# Patient Record
Sex: MALE | Race: WHITE | URBAN - METROPOLITAN AREA
[De-identification: names, ages, dates, MRNs, and addresses within clinical notes are randomized per-mention and may not be internally consistent; named-entity substitution may affect disease eponyms.]

---

## 2021-01-27 ENCOUNTER — OFFICE VISIT (OUTPATIENT)
Dept: HEMATOLOGY | Age: 61
End: 2021-01-27
Payer: MEDICAID

## 2021-01-27 ENCOUNTER — HOSPITAL ENCOUNTER (OUTPATIENT)
Dept: LAB | Age: 61
Discharge: HOME | End: 2021-01-27

## 2021-01-27 VITALS
HEART RATE: 149 BPM | WEIGHT: 183.31 LBS | HEIGHT: 66 IN | TEMPERATURE: 99.9 F | SYSTOLIC BLOOD PRESSURE: 146 MMHG | BODY MASS INDEX: 29.46 KG/M2 | DIASTOLIC BLOOD PRESSURE: 99 MMHG | OXYGEN SATURATION: 98 %

## 2021-01-27 DIAGNOSIS — K76.0 FATTY LIVER: Primary | ICD-10-CM

## 2021-01-27 LAB — XX-LABCORP SPECIMEN COL,LCBCF: NORMAL

## 2021-01-27 PROCEDURE — 99001 SPECIMEN HANDLING PT-LAB: CPT

## 2021-01-27 PROCEDURE — 99204 OFFICE O/P NEW MOD 45 MIN: CPT | Performed by: NURSE PRACTITIONER

## 2021-01-27 RX ORDER — OMEPRAZOLE 20 MG/1
TABLET, DELAYED RELEASE ORAL
COMMUNITY
Start: 2020-12-04

## 2021-01-27 RX ORDER — ROSUVASTATIN CALCIUM 10 MG/1
20 TABLET, COATED ORAL
COMMUNITY
Start: 2020-11-11 | End: 2021-08-26

## 2021-01-27 RX ORDER — ENALAPRIL MALEATE 20 MG/1
20 TABLET ORAL EVERY MORNING
COMMUNITY
Start: 2020-10-12

## 2021-01-27 NOTE — PROGRESS NOTES
[place] [address]      Nolan Cushing, MD, Jackie Gomez, Morrison Fabry, MD, MPH      Leonor Gagnon, PA-ALICIA Jay, [facility]-BC     Evelin Murdock, [facility]   Marly Musa, FNP-C    Isabel Llamaspaul, [facility]       Bensonsubha Patel Rudy De Trejo 136    at [address], [address] Edi Waddell  22.    [phone]    FAX: [address] [place] [address], [address]    [phone]    FAX: [phone]       Patient Care Team:  Jaja Mcleod MD as PCP - General (Family Medicine)  Emily Woods MD (Oncology)    Problem List  Date Reviewed: 2/1/2021          Codes Class Noted    Malignant melanoma [facility]) ICD-10-CM: C43.9  ICD-9-CM: 172.9  2/1/2021        Fatty liver ICD-10-CM: K76.0  ICD-9-CM: 571.8  2/1/2021        Hypertension ICD-10-CM: I10  ICD-9-CM: 401.9  2/1/2021        Hypercholesteremia ICD-10-CM: E78.00  ICD-9-CM: 272.0  2/1/2021            The clinicians listed above have asked me to see Valencia Ayers in consultation regarding suspected fatty liver disease and its management. All medical records sent by the referring physicians were reviewed including imaging studies     The patient is a 63 y.o.  male who is suspected to have fatty liver disease based upon PET/CT scan. The most recent laboratory studies indicate that the liver transaminases are elevated,  alkaline phosphatase is normal, tests of hepatic synthetic and metabolic function are normal, and the platelet count is normal.      Serologic evaluation for markers of chronic liver disease has either not been performed or the results are not     The most recent imaging of the liver was CT performed in 11/2020. Results suggest fatty liver disease.      An assessment of liver fibrosis with biopsy or elastography has not been performed. The patient has no symptoms which can be attributed to the liver disorder. The patient is not currently experiencing the following symptoms of liver disease: fatigue, pain in the right side over the liver. The patient completes all daily activities without any functional limitations. ASSESSMENT AND PLAN:  Fatty liver  Suspect the patient has fatty liver based upon imaging, features of metabolic syndrome. The histologic severity has not been defined. NAFL is a benign form of fatty liver disease and not thought to progress to fibrosis or cirrhosis. Liver transaminases are elevated. ALP is normal. Liver function is normal. The platelet count is normal.      Based upon laboratory studies and imaging  the patient does not appear to have significant liver injury. Have performed laboratory testing to monitor liver function and degree of liver injury. This included BMP,   hepatic panel, CBC with platelet count. Serologic testing for causes of chronic liver disease was ordered. Will perform additional serologic tests to screen for other causes of chronic liver disease. The need to perform an assessment of liver fibrosis was discussed with the patient. The Fibroscan can assess liver fibrosis and determine if a patient has advanced fibrosis or cirrhosis without the need for liver biopsy. This will be performed at the next office visit. If the Fibroscan suggests advanced fibrosis then a liver biopsy should be considered. The Fibroscan can be repeated annually or as often as clinically indicated to assess for fibrosis progression and/or regression. Since a liver biopsy was not performed it is possible the patient may not have fatty liver or this may not be contributing to the elevation in liver enzymes.       If liver enzymes do not return to normal with weight reduction then additional evaluation including liver biopsy may be necessary to determine if the elevated liver enzymes is due to another etiology. Counseling for diet and weight loss in patients with confirmed or suspected NAFLD  The patient was counseled regarding diet and exercise to achieve weight loss. The best diet for patients with fatty liver is one very low in carbohydrates and enriched with protein such as an Reese's program.      The patient was told not to consume any food products and drinks containing fructose as this enhances hepatic fat synthesis. There is no medication or vitamin supplements that we advocate for GAFFNEY. Using glitazones in patients without diabetes mellitus has been shown to reduce fat content in the liver but has no effect on fibrosis and is associated with weight gain. Vitamin E has also been used but the data is not very good and most experts no longer advocate this. Screening for Hepatocellular Carcinoma  HCC screening is not necessary if the patient has no evidence of cirrhosis. Treatment of other medical problems in patients with chronic liver disease  There are no contraindications for the patient to take most medications that are necessary for treatment of other medical issues. The patient can take any medications utilized for treatment of DM, statins to treat hypercholesterolemia    The patient does not consume alcohol on a daily basis. Normal doses of acetaminophen, as recommended on the label of the bottle, are not hepatotoxic except in the setting of daily alcohol use, even in patients with cirrhosis and can be utilized for pain. Counseling for alcohol in patients with chronic liver disease  The patient was counseled regarding alcohol consumption and the effect of alcohol on chronic liver disease. The patient does not consume any significant amount of alcohol. The patient was reminded that alcohol can cause fatty liver.     Vaccinations   Vaccination for viral hepatitis A and B is recommended since the patient has no serologic evidence of previous exposure or vaccination with immunity. Routine vaccinations against other bacterial and viral agents can be performed as indicated. Annual flu vaccination should be administered if indicated. ALLERGIES  Allergies   Allergen Reactions    Penicillins Hives       MEDICATIONS  Current Outpatient Medications   Medication Sig    rosuvastatin (CRESTOR) 10 mg tablet Take 10 mg by mouth.  enalapril (VASOTEC) 20 mg tablet Take 20 mg by mouth Every morning.  omeprazole (PRILOSEC OTC) 20 mg tablet      No current facility-administered medications for this visit. SYSTEM REVIEW NOT RELATED TO LIVER DISEASE OR REVIEWED ABOVE:  Constitution systems: Negative for fever, chills, weight gain, weight loss. Eyes: Negative for visual changes. ENT: Negative for sore throat, painful swallowing. Respiratory: Negative for cough, hemoptysis, SOB. Cardiology: Negative for chest pain, palpitations. GI:  Negative for constipation or diarrhea. : Negative for urinary frequency, dysuria, hematuria, nocturia. Skin: Negative for rash. Hematology: Negative for easy bruising, blood clots. Musculo-skelatal: Negative for back pain, muscle pain, weakness. Neurologic: Negative for headaches, dizziness, vertigo, memory problems not related to HE. Psychology: Negative for anxiety, depression. FAMILY HISTORY:  The father Has/had the following following chronic disease(s): cancer, HTN. The mother Has/had the following chronic disease(s): NONE. There is no family history of liver disease. SOCIAL HISTORY:  The patient is . The patient has 2 children. The patient has never used tobacco products. The patient has never consumed significant amounts of alcohol. The patient is currently receiving disability.         PHYSICAL EXAMINATION:  Visit Vitals  BP (!) 146/99   Pulse (!) 149   Temp 99.9 °F (37.7 °C) (Tympanic)   Ht 5' 6\" (1.676 m)   Wt 183 lb 5 oz (83.2 kg)   SpO2 98%   BMI 29.59 kg/m²       General: No acute distress. Eyes: Sclera anicteric. ENT: No oral lesions. Thyroid normal.  Nodes: No adenopathy. Skin: No spider angiomata. No jaundice. No palmar erythema. Respiratory: Lungs clear to auscultation. Cardiovascular: Regular heart rate. No murmurs. No JVD. Abdomen: Soft non-tender, liver size normal to percussion/palpation. Spleen not palpable. No obvious ascites. Extremities: No edema. No muscle wasting. No gross arthritic changes. Neurologic: Alert and oriented. Cranial nerves grossly intact. No asterixis. LABORATORY STUDIES:  Liver [place] of [address] & Units 1/27/2021   WBC 3.4 - 10.8 x10E3/uL 5.6   ANC 1.4 - 7.0 x10E3/uL 3.8   HGB 13.0 - 17.7 g/dL 16.3    - 450 x10E3/uL 246   AST 0 - 40 IU/L 64 (H)   ALT 0 - 44 IU/L 68 (H)   Alk Phos 39 - 117 IU/L 77   Bili, Total 0.0 - 1.2 mg/dL 0.3   Bili, Direct 0.00 - 0.40 mg/dL 0.15   Albumin 3.8 - 4.8 g/dL 4.9 (H)   BUN 8 - 27 mg/dL 8   Creat 0.76 - 1.27 mg/dL 0.83   Na 134 - 144 mmol/L 145 (H)   K 3.5 - 5.2 mmol/L 4.0   Cl 96 - 106 mmol/L 103   CO2 20 - 29 mmol/L 23   Glucose 65 - 99 mg/dL 94       SEROLOGIES:  Serologies Latest Ref Rng & Units 1/27/2021   Hep A Ab, Total Negative Negative   Hep B Surface Ag Negative Negative   Hep B Core Ab, Total Negative Negative   Hep B Surface AB QL  Non Reactive   Hep C Ab 0.0 - 0.9 s/co ratio 0.1   Ferritin 30 - 400 ng/mL 194   Iron % Saturation 15 - 55 % 37   GIOVANNI, IFA  Negative   ASMCA 0 - 19 Units 7       LIVER HISTOLOGY:  Not available or performed    ENDOSCOPIC PROCEDURES:  Not available or performed    RADIOLOGY:  Not available or performed    OTHER TESTING:  Not available or performed    FOLLOW-UP:  All of the issues listed above in the Assessment and Plan were discussed with the patient. All questions were answered. The patient expressed a clear understanding of the above.     Follow-up Isaiah Arriola 32 in 4 weeks for Fibroscan to review all data and determine the treatment plan.       INGE Allen-BC  [address] Liver [place] of [address], [facility] [address]  [facility], [address] [place] [address] Street - [address]  [phone]

## 2021-01-29 LAB
ACTIN IGG SERPL-ACNC: 7 UNITS (ref 0–19)
ALBUMIN SERPL-MCNC: 4.9 G/DL (ref 3.8–4.8)
ALP SERPL-CCNC: 77 IU/L (ref 39–117)
ALT SERPL-CCNC: 68 IU/L (ref 0–44)
ANA TITR SER IF: NEGATIVE {TITER}
AST SERPL-CCNC: 64 IU/L (ref 0–40)
BASOPHILS # BLD AUTO: 0.1 X10E3/UL (ref 0–0.2)
BASOPHILS NFR BLD AUTO: 1 %
BILIRUB DIRECT SERPL-MCNC: 0.15 MG/DL (ref 0–0.4)
BILIRUB SERPL-MCNC: 0.3 MG/DL (ref 0–1.2)
BUN SERPL-MCNC: 8 MG/DL (ref 8–27)
BUN/CREAT SERPL: 10 (ref 10–24)
CALCIUM SERPL-MCNC: 9.4 MG/DL (ref 8.6–10.2)
CHLORIDE SERPL-SCNC: 103 MMOL/L (ref 96–106)
CO2 SERPL-SCNC: 23 MMOL/L (ref 20–29)
CREAT SERPL-MCNC: 0.83 MG/DL (ref 0.76–1.27)
EOSINOPHIL # BLD AUTO: 0.1 X10E3/UL (ref 0–0.4)
EOSINOPHIL NFR BLD AUTO: 1 %
ERYTHROCYTE [DISTWIDTH] IN BLOOD BY AUTOMATED COUNT: 12.4 % (ref 11.6–15.4)
FERRITIN SERPL-MCNC: 194 NG/ML (ref 30–400)
GLUCOSE SERPL-MCNC: 94 MG/DL (ref 65–99)
HAV AB SER QL IA: NEGATIVE
HBV CORE AB SERPL QL IA: NEGATIVE
HBV SURFACE AB SER QL: NON REACTIVE
HBV SURFACE AG SERPL QL IA: NEGATIVE
HCT VFR BLD AUTO: 47.4 % (ref 37.5–51)
HCV AB S/CO SERPL IA: 0.1 S/CO RATIO (ref 0–0.9)
HCV AB SERPL QL IA: NORMAL
HGB BLD-MCNC: 16.3 G/DL (ref 13–17.7)
IMM GRANULOCYTES # BLD AUTO: 0.1 X10E3/UL (ref 0–0.1)
IMM GRANULOCYTES NFR BLD AUTO: 1 %
IRON SATN MFR SERPL: 37 % (ref 15–55)
IRON SERPL-MCNC: 114 UG/DL (ref 38–169)
LYMPHOCYTES # BLD AUTO: 1 X10E3/UL (ref 0.7–3.1)
LYMPHOCYTES NFR BLD AUTO: 18 %
MCH RBC QN AUTO: 33.1 PG (ref 26.6–33)
MCHC RBC AUTO-ENTMCNC: 34.4 G/DL (ref 31.5–35.7)
MCV RBC AUTO: 96 FL (ref 79–97)
MONOCYTES # BLD AUTO: 0.7 X10E3/UL (ref 0.1–0.9)
MONOCYTES NFR BLD AUTO: 12 %
NEUTROPHILS # BLD AUTO: 3.8 X10E3/UL (ref 1.4–7)
NEUTROPHILS NFR BLD AUTO: 67 %
PLATELET # BLD AUTO: 246 X10E3/UL (ref 150–450)
POTASSIUM SERPL-SCNC: 4 MMOL/L (ref 3.5–5.2)
PROT SERPL-MCNC: 7.4 G/DL (ref 6–8.5)
RBC # BLD AUTO: 4.93 X10E6/UL (ref 4.14–5.8)
SODIUM SERPL-SCNC: 145 MMOL/L (ref 134–144)
TIBC SERPL-MCNC: 309 UG/DL (ref 250–450)
UIBC SERPL-MCNC: 195 UG/DL (ref 111–343)
WBC # BLD AUTO: 5.6 X10E3/UL (ref 3.4–10.8)

## 2021-02-01 PROBLEM — E78.00 HYPERCHOLESTEREMIA: Status: ACTIVE | Noted: 2021-02-01

## 2021-02-01 PROBLEM — C43.9 MALIGNANT MELANOMA (HCC): Status: ACTIVE | Noted: 2021-02-01

## 2021-02-01 PROBLEM — K76.0 FATTY LIVER: Status: ACTIVE | Noted: 2021-02-01

## 2021-02-01 PROBLEM — I10 HYPERTENSION: Status: ACTIVE | Noted: 2021-02-01

## 2021-02-25 ENCOUNTER — OFFICE VISIT (OUTPATIENT)
Dept: HEMATOLOGY | Age: 61
End: 2021-02-25
Payer: MEDICAID

## 2021-02-25 VITALS
OXYGEN SATURATION: 99 % | RESPIRATION RATE: 17 BRPM | DIASTOLIC BLOOD PRESSURE: 90 MMHG | HEART RATE: 140 BPM | BODY MASS INDEX: 30.22 KG/M2 | HEIGHT: 66 IN | WEIGHT: 188 LBS | SYSTOLIC BLOOD PRESSURE: 145 MMHG

## 2021-02-25 DIAGNOSIS — K76.0 FATTY LIVER: Primary | ICD-10-CM

## 2021-02-25 PROCEDURE — 91200 LIVER ELASTOGRAPHY: CPT | Performed by: NURSE PRACTITIONER

## 2021-02-25 PROCEDURE — 99214 OFFICE O/P EST MOD 30 MIN: CPT | Performed by: NURSE PRACTITIONER

## 2021-02-25 NOTE — PROGRESS NOTES
[address] [facility]      Lj Gan MD, Gretchen Rabago, Katie Hunter MD, MPH      MATTHIAS Ta-ALICIA Alvarado, [facility]-BC     Evelin Murdock, [facility]   Jose Wells P-ALICIA Mullins, [facility]       Benson Patel Rudy De Trejo 136    at [address], [facility] Edi Waddell  22.    [phone]    FAX: [address] [place] [address] [address], [address]    [phone]    FAX: [phone]       Patient Care Team:  Hollis Torres MD as PCP - General (Family Medicine)  Alicia Alford MD (Oncology)    Problem List  Date Reviewed: 2/1/2021          Codes Class Noted    Malignant melanoma ([facility] [address] ICD-10-CM: C43.9  ICD-9-CM: 172.9  2/1/2021        Fatty liver ICD-10-CM: K76.0  ICD-9-CM: 571.8  2/1/2021        Hypertension ICD-10-CM: I10  ICD-9-CM: 401.9  2/1/2021        Hypercholesteremia ICD-10-CM: E78.00  ICD-9-CM: 272.0  2/1/2021              Tereso Buitrago is being seen at [facility] for management of suspected non-alcoholic fatty liver disease (NAFLD)  The active problem list, all pertinent past medical history, medications, liver histology, radiologic findings, and laboratory findings related to the liver disorder were reviewed with the patient. The patient is a 63 y.o.  male who is suspected to have fatty liver disease based upon PET/CT scan. Serologic evaluation for markers of chronic liver disease are negative. The most recent imaging of the liver was CT performed in 11/2020. Results suggest fatty liver disease. Assessment of liver fibrosis with Fibroscan was performed in the office today. The result was 7.4 kPa which correlates with stage 2 septal fibrosis.  The CAP score of 370 suggests hepatic steatosis. The patient has no symptoms which can be attributed to the liver disorder. The patient is not currently experiencing the following symptoms of liver disease: fatigue, pain in the right side over the liver. The patient completes all daily activities without any functional limitations. ASSESSMENT AND PLAN:  NAFLD  The diagnosis is based upon imaging, Fiboscan CAP score, features of metabolic syndrome, serologic studies that are negative for other causes of chronic liver disease. Elastography performed in 2/2021 suggests stage 2 septal fibrosis. Liver transaminases are elevated. ALP is normal. Liver function is normal. The platelet count is normal.      Based upon laboratory studies, Fibroscan, and imaging  the patient does not appear to have significant liver injury. Have performed laboratory testing to monitor liver function and degree of liver injury. This included BMP,   hepatic panel, CBC with platelet count. Serologic testing for causes of chronic liver disease were negative. The need to perform an assessment of liver fibrosis was discussed with the patient. The Fibroscan can assess liver fibrosis and determine if a patient has advanced fibrosis or cirrhosis without the need for liver biopsy. The Fibroscan can be repeated annually or as often as clinically indicated to assess for fibrosis progression and/or regression. Only a liver biopsy can confirm if the patient does have fatty liver and differentiate NAFL from GAFFNEY. The treatment is the same; weight reduction. If the liver biopsy demonstrates GAFFNEY the patient could be eligible for enrollment into clinical trials for treatment of GAFFENY. There is no reason to perform liver biopsy at this time. Liver chemistries will be monitored.       If the liver enzymes remain persistently elevated over the next 1-2 years a liver biopsy should be performed to ensure there is no ongoing chronic liver disease. Counseling for diet and weight loss in patients with confirmed or suspected NAFLD  The patient was counseled regarding diet and exercise to achieve weight loss. The best diet for patients with fatty liver is one very low in carbohydrates and enriched with protein such as an Reese's program.      The patient was told not to consume any food products and drinks containing fructose as this enhances hepatic fat synthesis. If the patient loses 20% of current body weight, which is 36 pounds, down to a weight of 152 pounds, all steatosis will have resolved. Once all steatosis has resolved all inflammation will resolve. Then all fibrosis will gradually resolve and the liver could eventually be normal.    There is no medication or vitamin supplements that we advocate for GAFFNEY. Using glitazones in patients without diabetes mellitus has been shown to reduce fat content in the liver but has no effect on fibrosis and is associated with weight gain. Vitamin E has also been used but the data is not very good and most experts no longer advocate this. Screening for Hepatocellular Carcinoma  HCC screening is not necessary if the patient has no evidence of cirrhosis. Treatment of other medical problems in patients with chronic liver disease  There are no contraindications for the patient to take most medications that are necessary for treatment of other medical issues. The patient can take any medications utilized for treatment of DM, statins to treat hypercholesterolemia    The patient does not consume alcohol on a daily basis. Normal doses of acetaminophen, as recommended on the label of the bottle, are not hepatotoxic except in the setting of daily alcohol use, even in patients with cirrhosis and can be utilized for pain.     Counseling for alcohol in patients with chronic liver disease  The patient was counseled regarding alcohol consumption and the effect of alcohol on chronic liver disease. The patient does not consume any significant amount of alcohol. The patient was reminded that alcohol can cause fatty liver. Vaccinations   Vaccination for viral hepatitis A and B is recommended since the patient has no serologic evidence of previous exposure or vaccination with immunity. Routine vaccinations against other bacterial and viral agents can be performed as indicated. Annual flu vaccination should be administered if indicated. ALLERGIES  Allergies   Allergen Reactions    Penicillins Hives       MEDICATIONS  Current Outpatient Medications   Medication Sig    rosuvastatin (CRESTOR) 10 mg tablet Take 10 mg by mouth.  enalapril (VASOTEC) 20 mg tablet Take 20 mg by mouth Every morning.  omeprazole (PRILOSEC OTC) 20 mg tablet      No current facility-administered medications for this visit. SYSTEM REVIEW NOT RELATED TO LIVER DISEASE OR REVIEWED ABOVE:  Constitution systems: Negative for fever, chills, weight gain, weight loss. Eyes: Negative for visual changes. ENT: Negative for sore throat, painful swallowing. Respiratory: Negative for cough, hemoptysis, SOB. Cardiology: Negative for chest pain, palpitations. GI:  Negative for constipation or diarrhea. : Negative for urinary frequency, dysuria, hematuria, nocturia. Skin: Negative for rash. Hematology: Negative for easy bruising, blood clots. Musculo-skelatal: Negative for back pain, muscle pain, weakness. Neurologic: Negative for headaches, dizziness, vertigo, memory problems not related to HE. Psychology: Negative for anxiety, depression. FAMILY HISTORY:  The father Has/had the following following chronic disease(s): cancer, HTN. The mother Has/had the following chronic disease(s): NONE. There is no family history of liver disease. SOCIAL HISTORY:  The patient is . The patient has 2 children. The patient has never used tobacco products.     The patient has never consumed significant amounts of alcohol. The patient is currently receiving disability. PHYSICAL EXAMINATION:  Visit Vitals  BP (!) 145/90 (BP 1 Location: Right arm, BP Patient Position: Sitting)   Pulse (!) 140   Resp 17   Ht 5' 6\" (1.676 m)   Wt 188 lb (85.3 kg)   SpO2 99%   BMI 30.34 kg/m²       General: No acute distress. Eyes: Sclera anicteric. ENT: No oral lesions. Thyroid normal.  Nodes: No adenopathy. Skin: No spider angiomata. No jaundice. No palmar erythema. Respiratory: Lungs clear to auscultation. Cardiovascular: Regular heart rate. No murmurs. No JVD. Abdomen: Soft non-tender, liver size normal to percussion/palpation. Spleen not palpable. No obvious ascites. Extremities: No edema. No muscle wasting. No gross arthritic changes. Neurologic: Alert and oriented. Cranial nerves grossly intact. No asterixis. LABORATORY STUDIES:  Liver [place] of [address] Units 1/27/2021   WBC 3.4 - 10.8 x10E3/uL 5.6   ANC 1.4 - 7.0 x10E3/uL 3.8   HGB 13.0 - 17.7 g/dL 16.3    - 450 x10E3/uL 246   AST 0 - 40 IU/L 64 (H)   ALT 0 - 44 IU/L 68 (H)   Alk Phos 39 - 117 IU/L 77   Bili, Total 0.0 - 1.2 mg/dL 0.3   Bili, Direct 0.00 - 0.40 mg/dL 0.15   Albumin 3.8 - 4.8 g/dL 4.9 (H)   BUN 8 - 27 mg/dL 8   Creat 0.76 - 1.27 mg/dL 0.83   Na 134 - 144 mmol/L 145 (H)   K 3.5 - 5.2 mmol/L 4.0   Cl 96 - 106 mmol/L 103   CO2 20 - 29 mmol/L 23   Glucose 65 - 99 mg/dL 94       SEROLOGIES:  Serologies Latest Ref Rng & Units 1/27/2021   Hep A Ab, Total Negative Negative   Hep B Surface Ag Negative Negative   Hep B Core Ab, Total Negative Negative   Hep B Surface AB QL  Non Reactive   Hep C Ab 0.0 - 0.9 s/co ratio 0.1   Ferritin 30 - 400 ng/mL 194   Iron % Saturation 15 - 55 % 37   GIOVANNI, IFA  Negative   ASMCA 0 - 19 Units 7       LIVER HISTOLOGY:  2/2021. FibroScan performed at [address]. EkPa was 7.4. IQR/med 11%. . The results suggested a fibrosis level of F2.  The CAP score suggests there is hepatic steatosis. ENDOSCOPIC PROCEDURES:  Not available or performed    RADIOLOGY:  Not available or performed    OTHER TESTING:  Not available or performed    FOLLOW-UP:  All of the issues listed above in the Assessment and Plan were discussed with the patient. All questions were answered. The patient expressed a clear understanding of the above. [address] [address] in 6 months to assess for the effects of diet changes and weight loss.       Ivory oCllazo, AGOSIELNP-BC  [address] [facility] Liver [place] [address] [address]  [facility], [address] [place] [address] Street [address] [phone]

## 2021-08-26 ENCOUNTER — OFFICE VISIT (OUTPATIENT)
Dept: HEMATOLOGY | Age: 61
End: 2021-08-26
Payer: MEDICAID

## 2021-08-26 ENCOUNTER — HOSPITAL ENCOUNTER (OUTPATIENT)
Dept: LAB | Age: 61
Discharge: HOME | End: 2021-08-26
Payer: MEDICAID

## 2021-08-26 VITALS
OXYGEN SATURATION: 98 % | TEMPERATURE: 97.1 F | BODY MASS INDEX: 30.08 KG/M2 | SYSTOLIC BLOOD PRESSURE: 147 MMHG | DIASTOLIC BLOOD PRESSURE: 94 MMHG | WEIGHT: 186.38 LBS

## 2021-08-26 DIAGNOSIS — K76.0 FATTY LIVER: Primary | ICD-10-CM

## 2021-08-26 DIAGNOSIS — K76.0 FATTY LIVER: ICD-10-CM

## 2021-08-26 LAB
ALBUMIN SERPL-MCNC: 4.1 G/DL (ref 3.4–5)
ALBUMIN/GLOB SERPL: 1.3 {RATIO} (ref 0.8–1.7)
ALP SERPL-CCNC: 66 U/L (ref 45–117)
ALT SERPL-CCNC: 58 U/L (ref 16–61)
ANION GAP SERPL CALC-SCNC: 1 MMOL/L (ref 3–18)
AST SERPL-CCNC: 45 U/L (ref 10–38)
BASOPHILS # BLD: 0 K/UL (ref 0–0.1)
BASOPHILS NFR BLD: 1 % (ref 0–2)
BILIRUB DIRECT SERPL-MCNC: 0.2 MG/DL (ref 0–0.2)
BILIRUB SERPL-MCNC: 0.7 MG/DL (ref 0.2–1)
BUN SERPL-MCNC: 12 MG/DL (ref 7–18)
BUN/CREAT SERPL: 11 (ref 12–20)
CALCIUM SERPL-MCNC: 9.1 MG/DL (ref 8.5–10.1)
CHLORIDE SERPL-SCNC: 106 MMOL/L (ref 100–111)
CO2 SERPL-SCNC: 33 MMOL/L (ref 21–32)
CREAT SERPL-MCNC: 1.09 MG/DL (ref 0.6–1.3)
DIFFERENTIAL METHOD BLD: ABNORMAL
EOSINOPHIL # BLD: 0.1 K/UL (ref 0–0.4)
EOSINOPHIL NFR BLD: 2 % (ref 0–5)
ERYTHROCYTE [DISTWIDTH] IN BLOOD BY AUTOMATED COUNT: 12.4 % (ref 11.6–14.5)
GLOBULIN SER CALC-MCNC: 3.1 G/DL (ref 2–4)
GLUCOSE SERPL-MCNC: 100 MG/DL (ref 74–99)
HCT VFR BLD AUTO: 45.3 % (ref 36–48)
HGB BLD-MCNC: 15.3 G/DL (ref 13–16)
LYMPHOCYTES # BLD: 0.9 K/UL (ref 0.9–3.6)
LYMPHOCYTES NFR BLD: 13 % (ref 21–52)
MCH RBC QN AUTO: 32.3 PG (ref 24–34)
MCHC RBC AUTO-ENTMCNC: 33.8 G/DL (ref 31–37)
MCV RBC AUTO: 95.6 FL (ref 78–100)
MONOCYTES # BLD: 0.9 K/UL (ref 0.05–1.2)
MONOCYTES NFR BLD: 14 % (ref 3–10)
NEUTS SEG # BLD: 4.5 K/UL (ref 1.8–8)
NEUTS SEG NFR BLD: 70 % (ref 40–73)
PLATELET # BLD AUTO: 223 K/UL (ref 135–420)
PMV BLD AUTO: 9.1 FL (ref 9.2–11.8)
POTASSIUM SERPL-SCNC: 4.6 MMOL/L (ref 3.5–5.5)
PROT SERPL-MCNC: 7.2 G/DL (ref 6.4–8.2)
RBC # BLD AUTO: 4.74 M/UL (ref 4.35–5.65)
SODIUM SERPL-SCNC: 140 MMOL/L (ref 136–145)
WBC # BLD AUTO: 6.5 K/UL (ref 4.6–13.2)

## 2021-08-26 PROCEDURE — 85025 COMPLETE CBC W/AUTO DIFF WBC: CPT

## 2021-08-26 PROCEDURE — 36415 COLL VENOUS BLD VENIPUNCTURE: CPT

## 2021-08-26 PROCEDURE — 99213 OFFICE O/P EST LOW 20 MIN: CPT | Performed by: NURSE PRACTITIONER

## 2021-08-26 PROCEDURE — 80048 BASIC METABOLIC PNL TOTAL CA: CPT

## 2021-08-26 PROCEDURE — 80076 HEPATIC FUNCTION PANEL: CPT

## 2021-08-26 RX ORDER — ROSUVASTATIN CALCIUM 20 MG/1
20 TABLET, COATED ORAL
COMMUNITY

## 2021-08-26 NOTE — PROGRESS NOTES
[address] [facility]      Susan Prieto MD, Lizbeth Garibay, Valeria Juarez MD, MPH      YARIEL Zhao, [facility]     April S Collette, [facility]   Cony Yin ANEUDY-ALICIA Guzman, [facility]       Benson Patel Rudy De Trejo 136    at [address], [address] [place] [address] Edi Gauthier  22.    [phone]    FAX: [address] [place] [address]    at [address], [address]    [phone]    FAX: [phone]       Patient Care Team:  Linn Ellison NP as PCP - General (Family Medicine)  Familia Ramos MD (Oncology)    Problem List  Date Reviewed: 8/26/2021        Codes Class Noted    Malignant melanoma [facility]) ICD-10-CM: C43.9  ICD-9-CM: 172.9  2/1/2021        Fatty liver ICD-10-CM: K76.0  ICD-9-CM: 571.8  2/1/2021        Hypertension ICD-10-CM: I10  ICD-9-CM: 401.9  2/1/2021        Hypercholesteremia ICD-10-CM: E78.00  ICD-9-CM: 272.0  2/1/2021              Teri Daniels is being seen at [address] for management of suspected non-alcoholic fatty liver disease (NAFLD)  The active problem list, all pertinent past medical history, medications, liver histology, radiologic findings, and laboratory findings related to the liver disorder were reviewed with the patient. The patient is a 63 y.o. male who is suspected to have fatty liver disease based upon PET/CT scan. Serologic evaluation for markers of chronic liver disease are negative. The most recent imaging of the liver was CT performed in 11/2020. Results suggest fatty liver disease. Assessment of liver fibrosis with Fibroscan was performed in 2/2021. The result was 7.4 kPa which correlates with stage 2 septal fibrosis. The CAP score of 370 suggests hepatic steatosis.     The patient has no symptoms which can be attributed to the liver disorder. The patient is not currently experiencing the following symptoms of liver disease: fatigue, pain in the right side over the liver. The patient completes all daily activities without any functional limitations. ASSESSMENT AND PLAN:  NAFLD  The diagnosis is based upon imaging, Fiboscan CAP score, features of metabolic syndrome, serologic studies that are negative for other causes of chronic liver disease. Elastography performed in 2/2021 suggests stage 2 septal fibrosis. Liver transaminases are elevated. ALP is normal. Liver function is normal. The platelet count is normal.      Based upon laboratory studies, Fibroscan, and imaging  the patient does not appear to have significant liver injury. Will perform laboratory testing to monitor liver function and degree of liver injury. This included BMP,   hepatic panel, CBC with platelet count. Serologic testing for causes of chronic liver disease were negative. The need to perform an assessment of liver fibrosis was discussed with the patient. The Fibroscan can assess liver fibrosis and determine if a patient has advanced fibrosis or cirrhosis without the need for liver biopsy. The Fibroscan can be repeated annually or as often as clinically indicated to assess for fibrosis progression and/or regression. Only a liver biopsy can confirm if the patient does have fatty liver and differentiate NAFL from GAFFNEY. The treatment is the same; weight reduction. If the liver biopsy demonstrates GAFFNEY the patient could be eligible for enrollment into clinical trials for treatment of GAFFNEY. There is no reason to perform liver biopsy at this time. Liver chemistries will be monitored. If the liver enzymes remain persistently elevated over the next 1-2 years a liver biopsy should be performed to ensure there is no ongoing chronic liver disease.       Counseling for diet and weight loss in patients with confirmed or suspected NAFLD  The patient was counseled regarding diet and exercise to achieve weight loss. The best diet for patients with fatty liver is one very low in carbohydrates and enriched with protein such as an Reese's program.      The patient was told not to consume any food products and drinks containing fructose as this enhances hepatic fat synthesis. If the patient loses 20% of current body weight, which is 36 pounds, down to a weight of 152 pounds, all steatosis will have resolved. Once all steatosis has resolved all inflammation will resolve. Then all fibrosis will gradually resolve and the liver could eventually be normal.    There is no medication or vitamin supplements that we advocate for GAFFNEY. Using glitazones in patients without diabetes mellitus has been shown to reduce fat content in the liver but has no effect on fibrosis and is associated with weight gain. Vitamin E has also been used but the data is not very good and most experts no longer advocate this. Screening for Hepatocellular Carcinoma  HCC screening is not necessary if the patient has no evidence of cirrhosis. Treatment of other medical problems in patients with chronic liver disease  There are no contraindications for the patient to take most medications that are necessary for treatment of other medical issues. The patient can take any medications utilized for treatment of DM, statins to treat hypercholesterolemia    The patient does not consume alcohol on a daily basis. Normal doses of acetaminophen, as recommended on the label of the bottle, are not hepatotoxic except in the setting of daily alcohol use, even in patients with cirrhosis and can be utilized for pain. Counseling for alcohol in patients with chronic liver disease  The patient was counseled regarding alcohol consumption and the effect of alcohol on chronic liver disease.   The patient does not consume any significant amount of alcohol. The patient was reminded that alcohol can cause fatty liver. Vaccinations   Vaccination for viral hepatitis A and B is recommended since the patient has no serologic evidence of previous exposure or vaccination with immunity. Routine vaccinations against other bacterial and viral agents can be performed as indicated. Annual flu vaccination should be administered if indicated. ALLERGIES  Allergies   Allergen Reactions    Penicillins Hives       MEDICATIONS  Current Outpatient Medications   Medication Sig    rosuvastatin (CRESTOR) 20 mg tablet Take 20 mg by mouth nightly.  enalapril (VASOTEC) 20 mg tablet Take 20 mg by mouth Every morning.  omeprazole (PRILOSEC OTC) 20 mg tablet      No current facility-administered medications for this visit. SYSTEM REVIEW NOT RELATED TO LIVER DISEASE OR REVIEWED ABOVE:  Constitution systems: Negative for fever, chills, weight gain, weight loss. Eyes: Negative for visual changes. ENT: Negative for sore throat, painful swallowing. Respiratory: Negative for cough, hemoptysis, SOB. Cardiology: Negative for chest pain, palpitations. GI:  Negative for constipation or diarrhea. : Negative for urinary frequency, dysuria, hematuria, nocturia. Skin: Negative for rash. Hematology: Negative for easy bruising, blood clots. Musculo-skelatal: Negative for back pain, muscle pain, weakness. Neurologic: Negative for headaches, dizziness, vertigo, memory problems not related to HE. Psychology: Negative for anxiety, depression. FAMILY HISTORY:  The father Has/had the following following chronic disease(s): cancer, HTN. The mother Has/had the following chronic disease(s): NONE. There is no family history of liver disease. SOCIAL HISTORY:  The patient is . The patient has 2 children. The patient has never used tobacco products. The patient has never consumed significant amounts of alcohol.     The patient is currently receiving disability. PHYSICAL EXAMINATION:  Visit Vitals  BP (!) 147/94   Temp 97.1 °F (36.2 °C) (Tympanic)   Wt 186 lb 6 oz (84.5 kg)   SpO2 98%   BMI 30.08 kg/m²     General: No acute distress. Eyes: Sclera anicteric. ENT: No oral lesions. Thyroid normal.  Nodes: No adenopathy. Skin: No spider angiomata. No jaundice. No palmar erythema. Respiratory: Lungs clear to auscultation. Cardiovascular: Regular heart rate. No murmurs. No JVD. Abdomen: Soft non-tender, liver size normal to percussion/palpation. Spleen not palpable. No obvious ascites. Extremities: No edema. No muscle wasting. No gross arthritic changes. Neurologic: Alert and oriented. Cranial nerves grossly intact. No asterixis. LABORATORY STUDIES:  Liver [place] of [address] Units 1/27/2021   WBC 3.4 - 10.8 x10E3/uL 5.6   ANC 1.4 - 7.0 x10E3/uL 3.8   HGB 13.0 - 17.7 g/dL 16.3    - 450 x10E3/uL 246   AST 0 - 40 IU/L 64 (H)   ALT 0 - 44 IU/L 68 (H)   Alk Phos 39 - 117 IU/L 77   Bili, Total 0.0 - 1.2 mg/dL 0.3   Bili, Direct 0.00 - 0.40 mg/dL 0.15   Albumin 3.8 - 4.8 g/dL 4.9 (H)   BUN 8 - 27 mg/dL 8   Creat 0.76 - 1.27 mg/dL 0.83   Na 134 - 144 mmol/L 145 (H)   K 3.5 - 5.2 mmol/L 4.0   Cl 96 - 106 mmol/L 103   CO2 20 - 29 mmol/L 23   Glucose 65 - 99 mg/dL 94       SEROLOGIES:  Serologies Latest Ref Rng & Units 1/27/2021   Hep A Ab, Total Negative Negative   Hep B Surface Ag Negative Negative   Hep B Core Ab, Total Negative Negative   Hep B Surface AB QL  Non Reactive   Hep C Ab 0.0 - 0.9 s/co ratio 0.1   Ferritin 30 - 400 ng/mL 194   Iron % Saturation 15 - 55 % 37   GIOVANNI, IFA  Negative   ASMCA 0 - 19 Units 7       LIVER HISTOLOGY:  2/2021. [facility]. EkPa was 7.4. IQR/med 11%. . The results suggested a fibrosis level of F2. The CAP score suggests there is hepatic steatosis.       ENDOSCOPIC PROCEDURES:  Not available or performed    RADIOLOGY:  Not available or performed    OTHER TESTING:  Not available or performed    FOLLOW-UP:  All of the issues listed above in the Assessment and Plan were discussed with the patient. All questions were answered. The patient expressed a clear understanding of the above. [address] [facility] in 6 months to assess for the effects of diet changes and weight loss. Fibroscan at next visit.        INGE Randall-BC  [address] [place] of [address], [address]  [facility], [address] [place] [address] Street - [address] [phone]